# Patient Record
Sex: MALE | Race: WHITE | HISPANIC OR LATINO | URBAN - METROPOLITAN AREA
[De-identification: names, ages, dates, MRNs, and addresses within clinical notes are randomized per-mention and may not be internally consistent; named-entity substitution may affect disease eponyms.]

---

## 2019-09-04 ENCOUNTER — IMPORTED ENCOUNTER (OUTPATIENT)
Dept: URBAN - METROPOLITAN AREA CLINIC 31 | Facility: CLINIC | Age: 49
End: 2019-09-04

## 2019-09-04 PROBLEM — Z96.1: Noted: 2019-09-04

## 2019-09-04 PROCEDURE — 99204 OFFICE O/P NEW MOD 45 MIN: CPT

## 2019-09-04 NOTE — PATIENT DISCUSSION
Pseudophakia OD - s/p recent complicated cataract surgery OD with AC IOL and possibly dropped nucleus and vitrectomy and choroidal hemorrhage? Discussed. Poor vision priorto surgery possibly due to facial trauma/ optic neuropathy? but now worse since cataract surgery. Discussed. Advised to follow at [facility].  Will see PRN

## 2022-04-02 ASSESSMENT — VISUAL ACUITY: OS_CC: 20/20

## 2022-07-09 ENCOUNTER — TELEPHONE ENCOUNTER (OUTPATIENT)
Dept: URBAN - METROPOLITAN AREA CLINIC 121 | Facility: CLINIC | Age: 52
End: 2022-07-09

## 2022-07-10 ENCOUNTER — TELEPHONE ENCOUNTER (OUTPATIENT)
Dept: URBAN - METROPOLITAN AREA CLINIC 121 | Facility: CLINIC | Age: 52
End: 2022-07-10